# Patient Record
Sex: FEMALE | Race: WHITE | HISPANIC OR LATINO | URBAN - METROPOLITAN AREA
[De-identification: names, ages, dates, MRNs, and addresses within clinical notes are randomized per-mention and may not be internally consistent; named-entity substitution may affect disease eponyms.]

---

## 2018-08-04 ENCOUNTER — EMERGENCY (EMERGENCY)
Facility: HOSPITAL | Age: 33
LOS: 1 days | Discharge: HOME | End: 2018-08-04
Attending: EMERGENCY MEDICINE
Payer: SELF-PAY

## 2018-08-04 VITALS
RESPIRATION RATE: 18 BRPM | TEMPERATURE: 98 F | HEART RATE: 79 BPM | WEIGHT: 145.06 LBS | SYSTOLIC BLOOD PRESSURE: 123 MMHG | DIASTOLIC BLOOD PRESSURE: 86 MMHG | OXYGEN SATURATION: 96 %

## 2018-08-04 VITALS
DIASTOLIC BLOOD PRESSURE: 81 MMHG | OXYGEN SATURATION: 100 % | RESPIRATION RATE: 18 BRPM | HEART RATE: 63 BPM | SYSTOLIC BLOOD PRESSURE: 112 MMHG

## 2018-08-04 LAB
ALBUMIN SERPL ELPH-MCNC: 4.6 G/DL — SIGNIFICANT CHANGE UP (ref 3.3–5)
ALP SERPL-CCNC: 67 U/L — SIGNIFICANT CHANGE UP (ref 40–120)
ALT FLD-CCNC: 10 U/L — SIGNIFICANT CHANGE UP (ref 10–45)
ANION GAP SERPL CALC-SCNC: 15 MMOL/L — SIGNIFICANT CHANGE UP (ref 5–17)
APPEARANCE UR: ABNORMAL
AST SERPL-CCNC: 13 U/L — SIGNIFICANT CHANGE UP (ref 10–40)
B-OH-BUTYR SERPL-SCNC: 1.6 MMOL/L — HIGH
BACTERIA # UR AUTO: ABNORMAL /HPF
BASOPHILS # BLD AUTO: 0 K/UL — SIGNIFICANT CHANGE UP (ref 0–0.2)
BASOPHILS NFR BLD AUTO: 0.4 % — SIGNIFICANT CHANGE UP (ref 0–2)
BILIRUB DIRECT SERPL-MCNC: 0.1 MG/DL — SIGNIFICANT CHANGE UP (ref 0–0.2)
BILIRUB SERPL-MCNC: 0.7 MG/DL — SIGNIFICANT CHANGE UP (ref 0.2–1.2)
BILIRUB UR-MCNC: NEGATIVE — SIGNIFICANT CHANGE UP
BUN SERPL-MCNC: 10 MG/DL — SIGNIFICANT CHANGE UP (ref 7–23)
CALCIUM SERPL-MCNC: 9.6 MG/DL — SIGNIFICANT CHANGE UP (ref 8.4–10.5)
CHLORIDE SERPL-SCNC: 97 MMOL/L — SIGNIFICANT CHANGE UP (ref 96–108)
CO2 SERPL-SCNC: 20 MMOL/L — LOW (ref 22–31)
COLOR SPEC: YELLOW — SIGNIFICANT CHANGE UP
CREAT SERPL-MCNC: 0.68 MG/DL — SIGNIFICANT CHANGE UP (ref 0.5–1.3)
DIFF PNL FLD: NEGATIVE — SIGNIFICANT CHANGE UP
EOSINOPHIL # BLD AUTO: 0.1 K/UL — SIGNIFICANT CHANGE UP (ref 0–0.5)
EOSINOPHIL NFR BLD AUTO: 1 % — SIGNIFICANT CHANGE UP (ref 0–6)
EPI CELLS # UR: SIGNIFICANT CHANGE UP /HPF
GAS PNL BLDV: SIGNIFICANT CHANGE UP
GLUCOSE SERPL-MCNC: 262 MG/DL — HIGH (ref 70–99)
GLUCOSE UR QL: 300 MG/DL
HCG UR QL: NEGATIVE — SIGNIFICANT CHANGE UP
HCT VFR BLD CALC: 45.6 % — HIGH (ref 34.5–45)
HGB BLD-MCNC: 15.7 G/DL — HIGH (ref 11.5–15.5)
HYALINE CASTS # UR AUTO: ABNORMAL
KETONES UR-MCNC: ABNORMAL
LEUKOCYTE ESTERASE UR-ACNC: ABNORMAL
LIDOCAIN IGE QN: 12 U/L — SIGNIFICANT CHANGE UP (ref 7–60)
LYMPHOCYTES # BLD AUTO: 1.8 K/UL — SIGNIFICANT CHANGE UP (ref 1–3.3)
LYMPHOCYTES # BLD AUTO: 22.4 % — SIGNIFICANT CHANGE UP (ref 13–44)
MAGNESIUM SERPL-MCNC: 1.9 MG/DL — SIGNIFICANT CHANGE UP (ref 1.6–2.6)
MCHC RBC-ENTMCNC: 29.8 PG — SIGNIFICANT CHANGE UP (ref 27–34)
MCHC RBC-ENTMCNC: 34.3 GM/DL — SIGNIFICANT CHANGE UP (ref 32–36)
MCV RBC AUTO: 86.9 FL — SIGNIFICANT CHANGE UP (ref 80–100)
MONOCYTES # BLD AUTO: 0.4 K/UL — SIGNIFICANT CHANGE UP (ref 0–0.9)
MONOCYTES NFR BLD AUTO: 4.7 % — SIGNIFICANT CHANGE UP (ref 2–14)
NEUTROPHILS # BLD AUTO: 5.8 K/UL — SIGNIFICANT CHANGE UP (ref 1.8–7.4)
NEUTROPHILS NFR BLD AUTO: 71.6 % — SIGNIFICANT CHANGE UP (ref 43–77)
NITRITE UR-MCNC: NEGATIVE — SIGNIFICANT CHANGE UP
PH UR: 5.5 — SIGNIFICANT CHANGE UP (ref 5–8)
PLATELET # BLD AUTO: 230 K/UL — SIGNIFICANT CHANGE UP (ref 150–400)
POTASSIUM SERPL-MCNC: 3.7 MMOL/L — SIGNIFICANT CHANGE UP (ref 3.5–5.3)
POTASSIUM SERPL-SCNC: 3.7 MMOL/L — SIGNIFICANT CHANGE UP (ref 3.5–5.3)
PROT SERPL-MCNC: 8.1 G/DL — SIGNIFICANT CHANGE UP (ref 6–8.3)
PROT UR-MCNC: 100 MG/DL
RBC # BLD: 5.25 M/UL — HIGH (ref 3.8–5.2)
RBC # FLD: 11.6 % — SIGNIFICANT CHANGE UP (ref 10.3–14.5)
RBC CASTS # UR COMP ASSIST: SIGNIFICANT CHANGE UP /HPF (ref 0–2)
SODIUM SERPL-SCNC: 132 MMOL/L — LOW (ref 135–145)
SP GR SPEC: >1.03 — HIGH (ref 1.01–1.02)
UROBILINOGEN FLD QL: 1 MG/DL
WBC # BLD: 8 K/UL — SIGNIFICANT CHANGE UP (ref 3.8–10.5)
WBC # FLD AUTO: 8 K/UL — SIGNIFICANT CHANGE UP (ref 3.8–10.5)
WBC UR QL: SIGNIFICANT CHANGE UP /HPF (ref 0–5)

## 2018-08-04 PROCEDURE — 83605 ASSAY OF LACTIC ACID: CPT

## 2018-08-04 PROCEDURE — 83735 ASSAY OF MAGNESIUM: CPT

## 2018-08-04 PROCEDURE — 82330 ASSAY OF CALCIUM: CPT

## 2018-08-04 PROCEDURE — 76705 ECHO EXAM OF ABDOMEN: CPT

## 2018-08-04 PROCEDURE — 84132 ASSAY OF SERUM POTASSIUM: CPT

## 2018-08-04 PROCEDURE — 83690 ASSAY OF LIPASE: CPT

## 2018-08-04 PROCEDURE — 76705 ECHO EXAM OF ABDOMEN: CPT | Mod: 26

## 2018-08-04 PROCEDURE — 80053 COMPREHEN METABOLIC PANEL: CPT

## 2018-08-04 PROCEDURE — 82962 GLUCOSE BLOOD TEST: CPT

## 2018-08-04 PROCEDURE — 96374 THER/PROPH/DIAG INJ IV PUSH: CPT

## 2018-08-04 PROCEDURE — 85014 HEMATOCRIT: CPT

## 2018-08-04 PROCEDURE — 96375 TX/PRO/DX INJ NEW DRUG ADDON: CPT

## 2018-08-04 PROCEDURE — 99284 EMERGENCY DEPT VISIT MOD MDM: CPT | Mod: 25

## 2018-08-04 PROCEDURE — 82248 BILIRUBIN DIRECT: CPT

## 2018-08-04 PROCEDURE — 81001 URINALYSIS AUTO W/SCOPE: CPT

## 2018-08-04 PROCEDURE — 82010 KETONE BODYS QUAN: CPT

## 2018-08-04 PROCEDURE — 93005 ELECTROCARDIOGRAM TRACING: CPT

## 2018-08-04 PROCEDURE — 71046 X-RAY EXAM CHEST 2 VIEWS: CPT | Mod: 26

## 2018-08-04 PROCEDURE — 96361 HYDRATE IV INFUSION ADD-ON: CPT

## 2018-08-04 PROCEDURE — 82435 ASSAY OF BLOOD CHLORIDE: CPT

## 2018-08-04 PROCEDURE — 84295 ASSAY OF SERUM SODIUM: CPT

## 2018-08-04 PROCEDURE — 82947 ASSAY GLUCOSE BLOOD QUANT: CPT

## 2018-08-04 PROCEDURE — 82803 BLOOD GASES ANY COMBINATION: CPT

## 2018-08-04 PROCEDURE — 85027 COMPLETE CBC AUTOMATED: CPT

## 2018-08-04 PROCEDURE — 71046 X-RAY EXAM CHEST 2 VIEWS: CPT

## 2018-08-04 PROCEDURE — 81025 URINE PREGNANCY TEST: CPT

## 2018-08-04 RX ORDER — ONDANSETRON 8 MG/1
1 TABLET, FILM COATED ORAL
Qty: 15 | Refills: 0 | OUTPATIENT
Start: 2018-08-04

## 2018-08-04 RX ORDER — FAMOTIDINE 10 MG/ML
20 INJECTION INTRAVENOUS ONCE
Qty: 0 | Refills: 0 | Status: COMPLETED | OUTPATIENT
Start: 2018-08-04 | End: 2018-08-04

## 2018-08-04 RX ORDER — FAMOTIDINE 10 MG/ML
1 INJECTION INTRAVENOUS
Qty: 14 | Refills: 0 | OUTPATIENT
Start: 2018-08-04 | End: 2018-08-17

## 2018-08-04 RX ORDER — SODIUM CHLORIDE 9 MG/ML
1000 INJECTION INTRAMUSCULAR; INTRAVENOUS; SUBCUTANEOUS ONCE
Qty: 0 | Refills: 0 | Status: COMPLETED | OUTPATIENT
Start: 2018-08-04 | End: 2018-08-04

## 2018-08-04 RX ORDER — ONDANSETRON 8 MG/1
4 TABLET, FILM COATED ORAL ONCE
Qty: 0 | Refills: 0 | Status: COMPLETED | OUTPATIENT
Start: 2018-08-04 | End: 2018-08-04

## 2018-08-04 RX ADMIN — FAMOTIDINE 20 MILLIGRAM(S): 10 INJECTION INTRAVENOUS at 09:38

## 2018-08-04 RX ADMIN — Medication 30 MILLILITER(S): at 09:38

## 2018-08-04 RX ADMIN — ONDANSETRON 4 MILLIGRAM(S): 8 TABLET, FILM COATED ORAL at 09:38

## 2018-08-04 RX ADMIN — SODIUM CHLORIDE 2000 MILLILITER(S): 9 INJECTION INTRAMUSCULAR; INTRAVENOUS; SUBCUTANEOUS at 11:11

## 2018-08-04 RX ADMIN — SODIUM CHLORIDE 2000 MILLILITER(S): 9 INJECTION INTRAMUSCULAR; INTRAVENOUS; SUBCUTANEOUS at 09:38

## 2018-08-04 RX ADMIN — SODIUM CHLORIDE 1000 MILLILITER(S): 9 INJECTION INTRAMUSCULAR; INTRAVENOUS; SUBCUTANEOUS at 11:11

## 2018-08-04 NOTE — ED PROVIDER NOTE - CONSTITUTIONAL, MLM
The patient is a 37y Female complaining of back pain general. normal... Well appearing, well nourished, awake, alert, oriented to person, place, time/situation and in no apparent distress.

## 2018-08-04 NOTE — ED PROVIDER NOTE - ATTENDING CONTRIBUTION TO CARE
Patient is a 34 yo F with history of DM, 3 days of upper/ epigastric pain. Radiates bilaterally and into chest. Did not take any medications. Denies hx of gallstones or abd surgeries. + nausea. No diarrhea.     VS noted  Gen. no acute distress, Non toxic   HEENT: EOMI, mmm  Lungs: CTAB/L no C/ W /R   CVS: RRR   Abd; Soft non tender, non distended, no CVA tenderness  Ext: no edema  Skin: no rash  Neuro AAOx3 non focal clear speech  a/p:   - Iliana ENGLAND Patient is a 32 yo F with history of DM here for evaluation of 3 days of upper/ epigastric burning pain. Pain started thursday night, radiates bilaterally in her abdomen and into her chest. Reports nausea, vomiting. Vomited nbnb yesterday x 4, immediately after attempting to eat or drink. Denies any other abdominal pain. Denies sob. No fevers. No diarrhea. Denies urinary symptoms. She did not take any medications for pain relief. Denies hx of gallstones or abd surgeries. + nausea.    VS noted  Gen. no acute distress, Non toxic   HEENT: EOMI, mmm  Lungs: CTAB/L no C/ W /R   CVS: RRR   Abd; Soft non tender, non distended, no CVA tenderness, no epigastric tenderness  Ext: no edema  Skin: no rash  Neuro AAOx3 non focal clear speech  a/p: epigastric burning pain with no focal exam findings. Differential includes GERD, pancreatitis. Pt is diabetic, low suspicion for DKA, AG. Will give GI cocktail, check labs, give IVF, check u/a, check RUQ US to r/o biliary pathology though low suspicion. Denies hx of pain with eating.   - Iliana ENGLAND Patient is a 32 yo F with history of DM here for evaluation of 3 days of upper/ epigastric burning pain. Pain started thursday night, radiates bilaterally in her abdomen and into her chest. Reports nausea, vomiting. Vomited nbnb yesterday x 4, immediately after attempting to eat or drink. Denies any other abdominal pain. Denies sob. No fevers. No diarrhea. Denies urinary symptoms. She did not take any medications for pain relief. Denies hx of gallstones or abd surgeries. + nausea. No chest pain. No sob.    VS noted  Gen. no acute distress, Non toxic   HEENT: EOMI, mmm  Lungs: CTAB/L no C/ W /R   CVS: RRR   Abd; Soft non tender, non distended, no CVA tenderness, no epigastric tenderness  Ext: no edema  Skin: no rash  Neuro AAOx3 non focal clear speech  a/p: epigastric burning pain with no focal exam findings. Differential includes GERD, pancreatitis. Pt is diabetic, low suspicion for DKA, AG. Will give GI cocktail, check labs, give IVF, check u/a, check RUQ US to r/o biliary pathology though low suspicion. Denies hx of pain with eating.   - Iliana ENGLAND Patient is a 32 yo F with history of DM here for evaluation of 3 days of upper/ epigastric burning pain that started after eating empanadas on Thursday. Pain started thursday night, radiates bilaterally in her abdomen and into her chest. Reports nausea, vomiting. Vomited nbnb yesterday x 4, immediately after attempting to eat or drink. Denies any other abdominal pain. Denies sob. No fevers. No diarrhea. Denies urinary symptoms. She did not take any medications for pain relief. Denies hx of gallstones or abd surgeries. + nausea. No chest pain. No sob.    VS noted  Gen. no acute distress, Non toxic   HEENT: EOMI, mmm  Lungs: CTAB/L no C/ W /R   CVS: RRR   Abd; Soft non tender, non distended, no CVA tenderness, no epigastric tenderness  Ext: no edema  Skin: no rash  Neuro AAOx3 non focal clear speech  a/p: epigastric burning pain with no focal exam findings. Differential includes GERD, pancreatitis. Pt is diabetic, low suspicion for DKA, AG. Will give GI cocktail, check labs, give IVF, check u/a, check RUQ US to r/o biliary pathology though low suspicion. Denies hx of pain with eating. EKG noted, no suspicion for ACS given history and examination of patient.  - Iliana ENGLAND

## 2018-08-04 NOTE — ED PROVIDER NOTE - PROGRESS NOTE DETAILS
Kirit: Symptomatically improved w/ antacids, tolerating PO. Labs and US reassuring. Noted ketosis w/o acidosis, likely related to dehydration/starvation. Will dc w/ pepcid and zofran, PMD f/u, return precautions for worsening symptoms.

## 2018-08-04 NOTE — ED PROVIDER NOTE - OBJECTIVE STATEMENT
33yof w/ DM2 p/w 3 days of upper abdominal pain and nausea. Pt primarily Kinyarwanda speaking, free  offered and refused, requests family at bedside to translate. Onset of symptoms 3 days ago after eating empanadas, dull/aching pain primarily epigastric, radiating across the upper quadrants and sometimes up into the left side of the chest, associated w/ nausea, watery non-bloody emesis, and generalized weakness. Pain has been constant since onset and worsens w/ meals. No fevers or chills. No diarrhea or constipation. No treatments at home. No abdominal surgeries.

## 2018-08-04 NOTE — ED ADULT NURSE REASSESSMENT NOTE - NS ED NURSE REASSESS COMMENT FT1
pt received 1100 from CARLOS Peña. States she is feeling better at this time. VSS/ NAD. Will continue to monitor.

## 2018-08-04 NOTE — ED PROVIDER NOTE - CARE PLAN
Principal Discharge DX:	Nausea and vomiting  Secondary Diagnosis:	Epigastric pain  Secondary Diagnosis:	Hyperglycemia

## 2018-08-04 NOTE — ED ADULT NURSE NOTE - OBJECTIVE STATEMENT
34 yo female presents to the ED from home c/o upper right quadrant abdominal pain with N/V x2days. patient states abdominal pain began 2 days ago, dull constant with left sided chest pressure associated with the abdominal pain. she states she vomited 3-4x yesterday, clear secretions. patient states abdominal pain is better after vomiting and worse after eating. she states abdominal pain began after eating empanadas. patient is AAOx3. lung sounds clear bilaterally. cap refill <3sec. abdomen is soft, tender to touch bilateral upper quadrants, non-distended. last BM was yesterday, normal as per patient. patient denies fevers, chills, diarrhea, HA, dizziness, cough, back pain, dysuria, hematuria, urinary frequency. JASON. MD at the bedside.

## 2018-08-04 NOTE — ED PROVIDER NOTE - MEDICAL DECISION MAKING DETAILS
Well appearing 33yof w/ 3 days of post prandial upper abdominal pain, benign abdomen w/ no signs of peritonitis, likely gastritis, will r/o pancreatitis, cholecystitis, DKA. CBC, CMP, lipase, RUQ sono, UA, culture. Symptomatic treatments w/ antacids and antiemetics.

## 2018-08-11 ENCOUNTER — EMERGENCY (EMERGENCY)
Facility: HOSPITAL | Age: 33
LOS: 1 days | Discharge: HOME | End: 2018-08-11
Attending: EMERGENCY MEDICINE
Payer: SELF-PAY

## 2018-08-11 VITALS
HEART RATE: 75 BPM | TEMPERATURE: 98 F | SYSTOLIC BLOOD PRESSURE: 132 MMHG | DIASTOLIC BLOOD PRESSURE: 85 MMHG | RESPIRATION RATE: 16 BRPM | OXYGEN SATURATION: 98 %

## 2018-08-11 VITALS
OXYGEN SATURATION: 100 % | RESPIRATION RATE: 16 BRPM | DIASTOLIC BLOOD PRESSURE: 76 MMHG | HEART RATE: 66 BPM | SYSTOLIC BLOOD PRESSURE: 122 MMHG

## 2018-08-11 PROBLEM — E11.9 TYPE 2 DIABETES MELLITUS WITHOUT COMPLICATIONS: Chronic | Status: ACTIVE | Noted: 2018-08-04

## 2018-08-11 LAB
ALBUMIN SERPL ELPH-MCNC: 4.3 G/DL — SIGNIFICANT CHANGE UP (ref 3.3–5)
ALP SERPL-CCNC: 57 U/L — SIGNIFICANT CHANGE UP (ref 40–120)
ALT FLD-CCNC: 9 U/L — LOW (ref 10–45)
ANION GAP SERPL CALC-SCNC: 13 MMOL/L — SIGNIFICANT CHANGE UP (ref 5–17)
AST SERPL-CCNC: 14 U/L — SIGNIFICANT CHANGE UP (ref 10–40)
BASOPHILS # BLD AUTO: 0 K/UL — SIGNIFICANT CHANGE UP (ref 0–0.2)
BASOPHILS NFR BLD AUTO: 0.1 % — SIGNIFICANT CHANGE UP (ref 0–2)
BILIRUB SERPL-MCNC: 0.5 MG/DL — SIGNIFICANT CHANGE UP (ref 0.2–1.2)
BUN SERPL-MCNC: 6 MG/DL — LOW (ref 7–23)
CALCIUM SERPL-MCNC: 9.4 MG/DL — SIGNIFICANT CHANGE UP (ref 8.4–10.5)
CHLORIDE SERPL-SCNC: 101 MMOL/L — SIGNIFICANT CHANGE UP (ref 96–108)
CO2 SERPL-SCNC: 22 MMOL/L — SIGNIFICANT CHANGE UP (ref 22–31)
CREAT SERPL-MCNC: 0.62 MG/DL — SIGNIFICANT CHANGE UP (ref 0.5–1.3)
EOSINOPHIL # BLD AUTO: 0.1 K/UL — SIGNIFICANT CHANGE UP (ref 0–0.5)
EOSINOPHIL NFR BLD AUTO: 0.7 % — SIGNIFICANT CHANGE UP (ref 0–6)
GAS PNL BLDV: SIGNIFICANT CHANGE UP
GLUCOSE SERPL-MCNC: 251 MG/DL — HIGH (ref 70–99)
HBA1C BLD-MCNC: 10.5 % — HIGH (ref 4–5.6)
HCG SERPL-ACNC: <2 MIU/ML — SIGNIFICANT CHANGE UP
HCT VFR BLD CALC: 42.2 % — SIGNIFICANT CHANGE UP (ref 34.5–45)
HGB BLD-MCNC: 14.4 G/DL — SIGNIFICANT CHANGE UP (ref 11.5–15.5)
LYMPHOCYTES # BLD AUTO: 1.6 K/UL — SIGNIFICANT CHANGE UP (ref 1–3.3)
LYMPHOCYTES # BLD AUTO: 20.5 % — SIGNIFICANT CHANGE UP (ref 13–44)
MCHC RBC-ENTMCNC: 30 PG — SIGNIFICANT CHANGE UP (ref 27–34)
MCHC RBC-ENTMCNC: 34.1 GM/DL — SIGNIFICANT CHANGE UP (ref 32–36)
MCV RBC AUTO: 87.9 FL — SIGNIFICANT CHANGE UP (ref 80–100)
MONOCYTES # BLD AUTO: 0.5 K/UL — SIGNIFICANT CHANGE UP (ref 0–0.9)
MONOCYTES NFR BLD AUTO: 5.7 % — SIGNIFICANT CHANGE UP (ref 2–14)
NEUTROPHILS # BLD AUTO: 5.8 K/UL — SIGNIFICANT CHANGE UP (ref 1.8–7.4)
NEUTROPHILS NFR BLD AUTO: 73.1 % — SIGNIFICANT CHANGE UP (ref 43–77)
PLATELET # BLD AUTO: 187 K/UL — SIGNIFICANT CHANGE UP (ref 150–400)
POTASSIUM SERPL-MCNC: 3.9 MMOL/L — SIGNIFICANT CHANGE UP (ref 3.5–5.3)
POTASSIUM SERPL-SCNC: 3.9 MMOL/L — SIGNIFICANT CHANGE UP (ref 3.5–5.3)
PROT SERPL-MCNC: 7.4 G/DL — SIGNIFICANT CHANGE UP (ref 6–8.3)
RBC # BLD: 4.81 M/UL — SIGNIFICANT CHANGE UP (ref 3.8–5.2)
RBC # FLD: 12.3 % — SIGNIFICANT CHANGE UP (ref 10.3–14.5)
SODIUM SERPL-SCNC: 136 MMOL/L — SIGNIFICANT CHANGE UP (ref 135–145)
TROPONIN T, HIGH SENSITIVITY RESULT: <6 NG/L — SIGNIFICANT CHANGE UP (ref 0–51)
WBC # BLD: 8 K/UL — SIGNIFICANT CHANGE UP (ref 3.8–10.5)
WBC # FLD AUTO: 8 K/UL — SIGNIFICANT CHANGE UP (ref 3.8–10.5)

## 2018-08-11 PROCEDURE — 84295 ASSAY OF SERUM SODIUM: CPT

## 2018-08-11 PROCEDURE — 71046 X-RAY EXAM CHEST 2 VIEWS: CPT

## 2018-08-11 PROCEDURE — 84443 ASSAY THYROID STIM HORMONE: CPT

## 2018-08-11 PROCEDURE — 99284 EMERGENCY DEPT VISIT MOD MDM: CPT | Mod: 25

## 2018-08-11 PROCEDURE — 83735 ASSAY OF MAGNESIUM: CPT

## 2018-08-11 PROCEDURE — 82947 ASSAY GLUCOSE BLOOD QUANT: CPT

## 2018-08-11 PROCEDURE — 85027 COMPLETE CBC AUTOMATED: CPT

## 2018-08-11 PROCEDURE — 80053 COMPREHEN METABOLIC PANEL: CPT

## 2018-08-11 PROCEDURE — 83605 ASSAY OF LACTIC ACID: CPT

## 2018-08-11 PROCEDURE — 82962 GLUCOSE BLOOD TEST: CPT

## 2018-08-11 PROCEDURE — 84132 ASSAY OF SERUM POTASSIUM: CPT

## 2018-08-11 PROCEDURE — 71046 X-RAY EXAM CHEST 2 VIEWS: CPT | Mod: 26

## 2018-08-11 PROCEDURE — 99285 EMERGENCY DEPT VISIT HI MDM: CPT

## 2018-08-11 PROCEDURE — 84702 CHORIONIC GONADOTROPIN TEST: CPT

## 2018-08-11 PROCEDURE — 85014 HEMATOCRIT: CPT

## 2018-08-11 PROCEDURE — 82435 ASSAY OF BLOOD CHLORIDE: CPT

## 2018-08-11 PROCEDURE — 82803 BLOOD GASES ANY COMBINATION: CPT

## 2018-08-11 PROCEDURE — 82330 ASSAY OF CALCIUM: CPT

## 2018-08-11 PROCEDURE — 84484 ASSAY OF TROPONIN QUANT: CPT

## 2018-08-11 PROCEDURE — 83036 HEMOGLOBIN GLYCOSYLATED A1C: CPT

## 2018-08-11 NOTE — ED PROVIDER NOTE - OBJECTIVE STATEMENT
32 y/o female with PMHx of type 2 DM presenting to the ED with 2 weeks of intermittant palpitations. The episodes range in duration from several minutes to all days long. 32 y/o female with PMHx of type 2 DM presenting to the ED with 2 weeks of intermittent palpitations. The episodes range in duration from several minutes to all day long--she hasn't noticed anything that makes the episodes better or worse. Associated symptoms include SOB during the episodes and feeling like her body is "cold". Denies any chest pain, jaw pain, arm pain, syncope, fever, chills, nausea, vomiting, diarrhea, abdominal pain, numbness, tingling, weakness, or headache. Pt was at the ED 2 weeks ago for nausea and vomiting, at which time she was told she had gastritis. Family hx of HTN and unspecified cardiac disease in mother in her 50's.

## 2018-08-11 NOTE — ED PROVIDER NOTE - CARE PLAN
Principal Discharge DX:	Palpitations  Secondary Diagnosis:	Type 2 diabetes mellitus with hyperglycemia, with long-term current use of insulin

## 2018-08-11 NOTE — ED PROVIDER NOTE - MEDICAL DECISION MAKING DETAILS
MD Yuliet,Attending: pt seen. agree with above HPI/ROS/PE. seen about 2 weeks ago here with epigastric pain/casper/vom--Dx: AGE. now with palpitations throughout day and keeping her up at night. No neck/arm/back/jaw/chest pain or pressure. No resp sxs. No PE risk factors. feels generally week. Afeb VSN . Exam WNL. FSBG 240 (elevated). EKG: Tw inv V1-3 o/w WNL. DDX: dysrythmia / highly doubt ACS or PE in light of Wells/PERC neg nad history. Jett-pericarditis --sxs atypical and EKG WNL, however did had ? viral illness with AGE 2 weeks ago. PTX/pneumonia/esphageal causes/aortic causes so unlikley as to not require further evaluation at this time: for Bloods with Troponin/Mag/HgB A1C/TSH/CXR. Cause unlikely to be found today in ED

## 2018-08-11 NOTE — ED PROVIDER NOTE - NS ED ROS FT
GENERAL: No fever or chills  EYES: no change in vision  HEENT: no trouble swallowing or speaking  CARDIAC: no chest pain  PULMONARY: no cough or SOB  GI: no abdominal pain, no nausea, no vomiting, no diarrhea or constipation  : No changes in urination  SKIN: no rashes  NEURO: no headache,  MSK: No joint pain     ~Shayne Jenkins PGY1 GENERAL: No fever or chills  EYES: no change in vision  HEENT: no trouble swallowing or speaking  CARDIAC: +palpiations, no chest pain  PULMONARY: +SOB during the episodes, no cough   GI: no abdominal pain, no nausea, no vomiting, no diarrhea or constipation  : No changes in urination  SKIN: no rashes  NEURO: no headache  MSK: No joint pain     ~Shayne Jenkins PGY1

## 2018-08-11 NOTE — ED PROVIDER NOTE - PHYSICAL EXAMINATION
Gen: AAOx3, non-toxic  Head: NCAT  HEENT: EOMI, oral mucosa moist, normal conjunctiva  Lung: CTAB, no respiratory distress, no wheezes/rhonchi/rales B/L, speaking in full sentences  CV: RRR, no murmurs, rubs or gallops  Abd: soft, NTND, no guarding, no CVA tenderness  MSK: no visible deformities  Neuro: No focal sensory or motor deficits  Skin: Warm, well perfused, no rash  Psych: normal affect.   ~Shayne Jenkins PGY1

## 2018-08-11 NOTE — ED ADULT NURSE NOTE - OBJECTIVE STATEMENT
32 y/o female presents to ed c/o chest pain. 34 y/o female presents to ed c/o heart racing and feeling cold Pt primarily speaks Kiswahili s/w Martha [id number]. States this has been going on for several months however has become worse over the past two weeks. Racing HR is intermittent, nothing makes it better or worse. Also c.o headache and rapid weight loss, with left side of body numbness that began two weeks ago. Hx of type 2 DB. Denies chest pain, sob, n/v/d, abdominal pain, f/c, urinary symptoms, hematuria. A&Ox4, vss, skin warm dry and intact, MAEx4, lungs CTA, abd soft nondistended. NSR on ekg, placed on CM. Pt resting comfortably with VSS, no complaints at this time. Patient's bed in the lowest position, explained plan of care to patient and family members. Will continue to reassess.

## 2018-08-11 NOTE — ED ADULT NURSE NOTE - NSIMPLEMENTINTERV_GEN_ALL_ED
Implemented All Universal Safety Interventions:  [place] to call system. Call bell, personal items and telephone within reach. Instruct patient to call for assistance. Room bathroom lighting operational. Non-slip footwear when patient is off stretcher. Physically safe environment: no spills, clutter or unnecessary equipment. Stretcher in lowest position, wheels locked, appropriate side rails in place.

## 2018-08-11 NOTE — ED ADULT TRIAGE NOTE - CHIEF COMPLAINT QUOTE
pt sanchez speaking c/o chest pressure for 2 weeks intermittently seen here last Saturday for similar complaint per  t

## 2018-08-12 LAB — TSH SERPL-MCNC: 1.33 UIU/ML — SIGNIFICANT CHANGE UP (ref 0.27–4.2)

## 2018-08-12 NOTE — ED POST DISCHARGE NOTE - DETAILS
Patient contacted and requesting  translate.  Informed them of elevated A1C.  Patient with known DM2 on victoza.  She is aware of elevated A1c in the past.  Scheduled to follow up with her PMD tomorrow.  -Perry Rae PA-C

## 2018-11-28 ENCOUNTER — EMERGENCY (EMERGENCY)
Facility: HOSPITAL | Age: 33
LOS: 1 days | Discharge: HOME | End: 2018-11-28
Attending: EMERGENCY MEDICINE
Payer: MEDICAID

## 2018-11-28 VITALS
DIASTOLIC BLOOD PRESSURE: 77 MMHG | TEMPERATURE: 98 F | WEIGHT: 134.92 LBS | SYSTOLIC BLOOD PRESSURE: 120 MMHG | HEART RATE: 66 BPM | RESPIRATION RATE: 16 BRPM | OXYGEN SATURATION: 99 %

## 2018-11-28 VITALS
RESPIRATION RATE: 17 BRPM | DIASTOLIC BLOOD PRESSURE: 87 MMHG | TEMPERATURE: 98 F | SYSTOLIC BLOOD PRESSURE: 132 MMHG | HEART RATE: 64 BPM | OXYGEN SATURATION: 99 %

## 2018-11-28 LAB
ALBUMIN SERPL ELPH-MCNC: 4.7 G/DL — SIGNIFICANT CHANGE UP (ref 3.3–5)
ALP SERPL-CCNC: 50 U/L — SIGNIFICANT CHANGE UP (ref 40–120)
ALT FLD-CCNC: 8 U/L — LOW (ref 10–45)
ANION GAP SERPL CALC-SCNC: 16 MMOL/L — SIGNIFICANT CHANGE UP (ref 5–17)
APPEARANCE UR: ABNORMAL
AST SERPL-CCNC: 13 U/L — SIGNIFICANT CHANGE UP (ref 10–40)
BACTERIA # UR AUTO: ABNORMAL
BASE EXCESS BLDV CALC-SCNC: 2.5 MMOL/L — HIGH (ref -2–2)
BASE EXCESS BLDV CALC-SCNC: 2.9 MMOL/L — HIGH (ref -2–2)
BASOPHILS # BLD AUTO: 0 K/UL — SIGNIFICANT CHANGE UP (ref 0–0.2)
BASOPHILS NFR BLD AUTO: 0.6 % — SIGNIFICANT CHANGE UP (ref 0–2)
BILIRUB SERPL-MCNC: 0.2 MG/DL — SIGNIFICANT CHANGE UP (ref 0.2–1.2)
BILIRUB UR-MCNC: NEGATIVE — SIGNIFICANT CHANGE UP
BUN SERPL-MCNC: 12 MG/DL — SIGNIFICANT CHANGE UP (ref 7–23)
CA-I SERPL-SCNC: 1.18 MMOL/L — SIGNIFICANT CHANGE UP (ref 1.12–1.3)
CA-I SERPL-SCNC: 1.19 MMOL/L — SIGNIFICANT CHANGE UP (ref 1.12–1.3)
CALCIUM SERPL-MCNC: 10.1 MG/DL — SIGNIFICANT CHANGE UP (ref 8.4–10.5)
CHLORIDE BLDV-SCNC: 106 MMOL/L — SIGNIFICANT CHANGE UP (ref 96–108)
CHLORIDE BLDV-SCNC: 107 MMOL/L — SIGNIFICANT CHANGE UP (ref 96–108)
CHLORIDE SERPL-SCNC: 100 MMOL/L — SIGNIFICANT CHANGE UP (ref 96–108)
CO2 BLDV-SCNC: 29 MMOL/L — SIGNIFICANT CHANGE UP (ref 22–30)
CO2 BLDV-SCNC: 29 MMOL/L — SIGNIFICANT CHANGE UP (ref 22–30)
CO2 SERPL-SCNC: 23 MMOL/L — SIGNIFICANT CHANGE UP (ref 22–31)
COLOR SPEC: COLORLESS — SIGNIFICANT CHANGE UP
CREAT SERPL-MCNC: 0.66 MG/DL — SIGNIFICANT CHANGE UP (ref 0.5–1.3)
DIFF PNL FLD: NEGATIVE — SIGNIFICANT CHANGE UP
EOSINOPHIL # BLD AUTO: 0.1 K/UL — SIGNIFICANT CHANGE UP (ref 0–0.5)
EOSINOPHIL NFR BLD AUTO: 2.1 % — SIGNIFICANT CHANGE UP (ref 0–6)
EPI CELLS # UR: 7 /HPF — HIGH
GAS PNL BLDV: 133 MMOL/L — LOW (ref 136–145)
GAS PNL BLDV: 134 MMOL/L — LOW (ref 136–145)
GAS PNL BLDV: SIGNIFICANT CHANGE UP
GLUCOSE BLDV-MCNC: 140 MG/DL — HIGH (ref 70–99)
GLUCOSE BLDV-MCNC: 142 MG/DL — HIGH (ref 70–99)
GLUCOSE SERPL-MCNC: 140 MG/DL — HIGH (ref 70–99)
GLUCOSE UR QL: NEGATIVE — SIGNIFICANT CHANGE UP
HCO3 BLDV-SCNC: 28 MMOL/L — SIGNIFICANT CHANGE UP (ref 21–29)
HCO3 BLDV-SCNC: 28 MMOL/L — SIGNIFICANT CHANGE UP (ref 21–29)
HCT VFR BLD CALC: 41.8 % — SIGNIFICANT CHANGE UP (ref 34.5–45)
HCT VFR BLDA CALC: 41 % — SIGNIFICANT CHANGE UP (ref 39–50)
HCT VFR BLDA CALC: 46 % — SIGNIFICANT CHANGE UP (ref 39–50)
HGB BLD CALC-MCNC: 13.3 G/DL — SIGNIFICANT CHANGE UP (ref 11.5–15.5)
HGB BLD CALC-MCNC: 14.9 G/DL — SIGNIFICANT CHANGE UP (ref 11.5–15.5)
HGB BLD-MCNC: 14.6 G/DL — SIGNIFICANT CHANGE UP (ref 11.5–15.5)
HYALINE CASTS # UR AUTO: 2 /LPF — SIGNIFICANT CHANGE UP (ref 0–7)
KETONES UR-MCNC: NEGATIVE — SIGNIFICANT CHANGE UP
LACTATE BLDV-MCNC: 2.7 MMOL/L — HIGH (ref 0.7–2)
LACTATE BLDV-MCNC: 3.2 MMOL/L — HIGH (ref 0.7–2)
LEUKOCYTE ESTERASE UR-ACNC: ABNORMAL
LIDOCAIN IGE QN: 20 U/L — SIGNIFICANT CHANGE UP (ref 7–60)
LYMPHOCYTES # BLD AUTO: 1.8 K/UL — SIGNIFICANT CHANGE UP (ref 1–3.3)
LYMPHOCYTES # BLD AUTO: 28.8 % — SIGNIFICANT CHANGE UP (ref 13–44)
MCHC RBC-ENTMCNC: 30.7 PG — SIGNIFICANT CHANGE UP (ref 27–34)
MCHC RBC-ENTMCNC: 35 GM/DL — SIGNIFICANT CHANGE UP (ref 32–36)
MCV RBC AUTO: 87.6 FL — SIGNIFICANT CHANGE UP (ref 80–100)
MONOCYTES # BLD AUTO: 0.3 K/UL — SIGNIFICANT CHANGE UP (ref 0–0.9)
MONOCYTES NFR BLD AUTO: 5.6 % — SIGNIFICANT CHANGE UP (ref 2–14)
NEUTROPHILS # BLD AUTO: 3.9 K/UL — SIGNIFICANT CHANGE UP (ref 1.8–7.4)
NEUTROPHILS NFR BLD AUTO: 62.9 % — SIGNIFICANT CHANGE UP (ref 43–77)
NITRITE UR-MCNC: NEGATIVE — SIGNIFICANT CHANGE UP
PCO2 BLDV: 44 MMHG — SIGNIFICANT CHANGE UP (ref 35–50)
PCO2 BLDV: 46 MMHG — SIGNIFICANT CHANGE UP (ref 35–50)
PH BLDV: 7.39 — SIGNIFICANT CHANGE UP (ref 7.35–7.45)
PH BLDV: 7.41 — SIGNIFICANT CHANGE UP (ref 7.35–7.45)
PH UR: 7 — SIGNIFICANT CHANGE UP (ref 5–8)
PLATELET # BLD AUTO: 210 K/UL — SIGNIFICANT CHANGE UP (ref 150–400)
PO2 BLDV: 32 MMHG — SIGNIFICANT CHANGE UP (ref 25–45)
PO2 BLDV: 37 MMHG — SIGNIFICANT CHANGE UP (ref 25–45)
POTASSIUM BLDV-SCNC: 3.6 MMOL/L — SIGNIFICANT CHANGE UP (ref 3.5–5.3)
POTASSIUM BLDV-SCNC: 4.1 MMOL/L — SIGNIFICANT CHANGE UP (ref 3.5–5.3)
POTASSIUM SERPL-MCNC: 4.5 MMOL/L — SIGNIFICANT CHANGE UP (ref 3.5–5.3)
POTASSIUM SERPL-SCNC: 4.5 MMOL/L — SIGNIFICANT CHANGE UP (ref 3.5–5.3)
PROT SERPL-MCNC: 7.7 G/DL — SIGNIFICANT CHANGE UP (ref 6–8.3)
PROT UR-MCNC: NEGATIVE — SIGNIFICANT CHANGE UP
RBC # BLD: 4.77 M/UL — SIGNIFICANT CHANGE UP (ref 3.8–5.2)
RBC # FLD: 12.3 % — SIGNIFICANT CHANGE UP (ref 10.3–14.5)
RBC CASTS # UR COMP ASSIST: 3 /HPF — SIGNIFICANT CHANGE UP (ref 0–4)
SAO2 % BLDV: 51 % — LOW (ref 67–88)
SAO2 % BLDV: 66 % — LOW (ref 67–88)
SODIUM SERPL-SCNC: 139 MMOL/L — SIGNIFICANT CHANGE UP (ref 135–145)
SP GR SPEC: 1 — LOW (ref 1.01–1.02)
TROPONIN T, HIGH SENSITIVITY RESULT: <6 NG/L — SIGNIFICANT CHANGE UP (ref 0–51)
UROBILINOGEN FLD QL: NEGATIVE — SIGNIFICANT CHANGE UP
WBC # BLD: 6.2 K/UL — SIGNIFICANT CHANGE UP (ref 3.8–10.5)
WBC # FLD AUTO: 6.2 K/UL — SIGNIFICANT CHANGE UP (ref 3.8–10.5)
WBC UR QL: 15 /HPF — HIGH (ref 0–5)

## 2018-11-28 PROCEDURE — 80053 COMPREHEN METABOLIC PANEL: CPT

## 2018-11-28 PROCEDURE — 82803 BLOOD GASES ANY COMBINATION: CPT

## 2018-11-28 PROCEDURE — 83605 ASSAY OF LACTIC ACID: CPT

## 2018-11-28 PROCEDURE — 84484 ASSAY OF TROPONIN QUANT: CPT

## 2018-11-28 PROCEDURE — 96374 THER/PROPH/DIAG INJ IV PUSH: CPT

## 2018-11-28 PROCEDURE — 82435 ASSAY OF BLOOD CHLORIDE: CPT

## 2018-11-28 PROCEDURE — 99284 EMERGENCY DEPT VISIT MOD MDM: CPT

## 2018-11-28 PROCEDURE — 81001 URINALYSIS AUTO W/SCOPE: CPT

## 2018-11-28 PROCEDURE — 93005 ELECTROCARDIOGRAM TRACING: CPT

## 2018-11-28 PROCEDURE — 83690 ASSAY OF LIPASE: CPT

## 2018-11-28 PROCEDURE — 76700 US EXAM ABDOM COMPLETE: CPT

## 2018-11-28 PROCEDURE — 82962 GLUCOSE BLOOD TEST: CPT

## 2018-11-28 PROCEDURE — 99284 EMERGENCY DEPT VISIT MOD MDM: CPT | Mod: 25

## 2018-11-28 PROCEDURE — 84132 ASSAY OF SERUM POTASSIUM: CPT

## 2018-11-28 PROCEDURE — 85027 COMPLETE CBC AUTOMATED: CPT

## 2018-11-28 PROCEDURE — 87086 URINE CULTURE/COLONY COUNT: CPT

## 2018-11-28 PROCEDURE — 82947 ASSAY GLUCOSE BLOOD QUANT: CPT

## 2018-11-28 PROCEDURE — 82330 ASSAY OF CALCIUM: CPT

## 2018-11-28 PROCEDURE — 84295 ASSAY OF SERUM SODIUM: CPT

## 2018-11-28 PROCEDURE — 85014 HEMATOCRIT: CPT

## 2018-11-28 PROCEDURE — 76700 US EXAM ABDOM COMPLETE: CPT | Mod: 26

## 2018-11-28 PROCEDURE — 96375 TX/PRO/DX INJ NEW DRUG ADDON: CPT

## 2018-11-28 RX ORDER — SODIUM CHLORIDE 9 MG/ML
1000 INJECTION, SOLUTION INTRAVENOUS ONCE
Qty: 0 | Refills: 0 | Status: COMPLETED | OUTPATIENT
Start: 2018-11-28 | End: 2018-11-28

## 2018-11-28 RX ORDER — FAMOTIDINE 10 MG/ML
20 INJECTION INTRAVENOUS ONCE
Qty: 0 | Refills: 0 | Status: COMPLETED | OUTPATIENT
Start: 2018-11-28 | End: 2018-11-28

## 2018-11-28 RX ORDER — METOCLOPRAMIDE HCL 10 MG
10 TABLET ORAL ONCE
Qty: 0 | Refills: 0 | Status: COMPLETED | OUTPATIENT
Start: 2018-11-28 | End: 2018-11-28

## 2018-11-28 RX ADMIN — SODIUM CHLORIDE 1000 MILLILITER(S): 9 INJECTION, SOLUTION INTRAVENOUS at 18:13

## 2018-11-28 RX ADMIN — Medication 10 MILLIGRAM(S): at 18:12

## 2018-11-28 RX ADMIN — Medication 30 MILLILITER(S): at 18:12

## 2018-11-28 RX ADMIN — FAMOTIDINE 20 MILLIGRAM(S): 10 INJECTION INTRAVENOUS at 18:12

## 2018-11-28 NOTE — ED PROVIDER NOTE - OBJECTIVE STATEMENT
32 yo female with PMHx of DM and gastritis p/w generalized weakness, dizziness and epigastric pain.  The patient reports that she has had intermittent dizziness, described as lightheadedness for >1 year.  Coming to the ER today for worsening epigastric pain.  Pain radiates up into the chest and is associated with nausea.  Denies fevers/chills, SOB, abd pain, VDC.  Patient has follow up with GI next week.

## 2018-11-28 NOTE — ED PROVIDER NOTE - ATTENDING CONTRIBUTION TO CARE
attending Eddiack: 33yF h/o DM, gastritis p/w epigastric pain.  Also with subacute complaint of  intermittent dizziness for >1 year.  Epigastric pain radiates up to chest, a/w nausea. Scheduled for follow-up with GI next week. On exam, NAD, abdomen soft, tender in epigastric area and RUQ with negative Suresh's sign, no scleral icterus or jaundice, pink conjunctiva. Will obtain labs, RUQ US, symptomatic treatment and reassess

## 2018-11-28 NOTE — ED PROVIDER NOTE - PHYSICAL EXAMINATION
Gen: AAO x 3, NAD  Skin: No rashes or lesions  HEENT: NC/AT, PERRLA, EOMI, MMM  Resp: unlabored CTAB  Cardiac: rrr s1s2, no murmurs, rubs or gallops  GI: ND, +BS, Soft, +breen's. + epigastric TTP with guarding.  Ext: no pedal edema, FROM in all extremities  Neuro: no focal deficits

## 2018-11-28 NOTE — ED ADULT NURSE REASSESSMENT NOTE - NS ED NURSE REASSESS COMMENT FT1
Patient back from US. Was transported to [facility] in error. Charge RN & PA uye aware. Patient medicated as ordered. States she has no nausea at this time and is requesting to eat. Patient back from US. Was transported to [facility] instead of [facility] in error. Charge RN & JESSIE turner aware. Patient medicated as ordered. States she has no nausea at this time and is requesting to eat. Patient given food tray per JESSIE turner to PO challenge.

## 2018-11-28 NOTE — ED ADULT NURSE NOTE - OBJECTIVE STATEMENT
33 year old female presents ambulatory to ED through waiting room with  complaining of mid upper abdominal pain & weakness. History of DM & gastritis. Has had dizziness x 1 year which she describes as lightheadedness. Mid upper abdominal pain radiates to RUQ & LUQ. 33 year old female presents to [facility] ED [facility] [address], ambulatory through waiting room with  complaining of mid upper abdominal pain, weakness, nausea. History of DM & gastritis. Has had dizziness x 1 year which she describes as lightheadedness. Mid upper abdominal pain radiates to RUQ & LUQ. Denies HA, CP, SOB, vomiting, diarrhea, fevers. side rails up with bed in lowest position for safety. blanket provided. Comfort and safety provided.

## 2018-11-28 NOTE — ED PROVIDER NOTE - PROGRESS NOTE DETAILS
US negative.  Patient tolerating PO.  Pain has improved.  WIll repeat lactate and reassess.  -Perry Rae PA-C Received sign out from JESSIE Rae, pt tolerated PO lactate downtrending w/ improved symptoms. Discussed w/ Dr. Soni lewis d/c now

## 2018-11-28 NOTE — ED PROVIDER NOTE - PLAN OF CARE
1.  Stay well hydrated  2.  Continue current home medications  3.  Take pepcid 20 mgs daily as needed for abdominal pain  4.  Follow up with your Gastroenterologist next week as previously scheduled  5.  Return to the ER for worsening abdominal pain, fevers/chills, Shortness of breath or any other concerning symptoms

## 2018-11-28 NOTE — ED PROVIDER NOTE - CARE PLAN
Principal Discharge DX:	Epigastric abdominal pain  Assessment and plan of treatment:	1.  Stay well hydrated  2.  Continue current home medications  3.  Take pepcid 20 mgs daily as needed for abdominal pain  4.  Follow up with your Gastroenterologist next week as previously scheduled  5.  Return to the ER for worsening abdominal pain, fevers/chills, Shortness of breath or any other concerning symptoms

## 2018-11-29 LAB
CULTURE RESULTS: SIGNIFICANT CHANGE UP
SPECIMEN SOURCE: SIGNIFICANT CHANGE UP

## 2019-12-06 NOTE — ED ADULT NURSE NOTE - NSIMPLEMENTINTERV_GEN_ALL_ED
PAST MEDICAL HISTORY:  Coronary artery disease     Hypercholesterolemia     Hypertension     Hyperthyroidism     Left eye trauma limited vision    Renal colic
Implemented All Universal Safety Interventions:  [place] to call system. Call bell, personal items and telephone within reach. Instruct patient to call for assistance. Room bathroom lighting operational. Non-slip footwear when patient is off stretcher. Physically safe environment: no spills, clutter or unnecessary equipment. Stretcher in lowest position, wheels locked, appropriate side rails in place.

## 2020-01-15 ENCOUNTER — APPOINTMENT (OUTPATIENT)
Dept: INTERNAL MEDICINE | Facility: CLINIC | Age: 35
End: 2020-01-15

## 2020-10-15 NOTE — ED ADULT TRIAGE NOTE - ACCOMPANIED BY
Positive skin test for fungal/candida infection  Due to extensive skin involvement will trial systemic antifungal treatment with fluconazole once weekly for 4 weeks  Avoid lotions at this time  Follow-up with your PCP x 1 week   Spouse/Significant other

## 2022-10-25 NOTE — ED ADULT NURSE NOTE - ISOLATION TYPE:
This provider called patient to discuss symptoms of possible reaction.  Left message to call us back
None
